# Patient Record
Sex: MALE | Race: WHITE | URBAN - METROPOLITAN AREA
[De-identification: names, ages, dates, MRNs, and addresses within clinical notes are randomized per-mention and may not be internally consistent; named-entity substitution may affect disease eponyms.]

---

## 2020-03-11 ENCOUNTER — OFFICE VISIT (OUTPATIENT)
Dept: URBAN - METROPOLITAN AREA CLINIC 71 | Facility: CLINIC | Age: 55
End: 2020-03-11
Payer: COMMERCIAL

## 2020-03-11 DIAGNOSIS — H52.4 PRESBYOPIA: Primary | ICD-10-CM

## 2020-03-11 DIAGNOSIS — H25.13 AGE-RELATED NUCLEAR CATARACT, BILATERAL: ICD-10-CM

## 2020-03-11 PROCEDURE — 92014 COMPRE OPH EXAM EST PT 1/>: CPT | Performed by: OPTOMETRIST

## 2020-03-11 RX ORDER — WARFARIN SODIUM 1 MG/1
1 MG TABLET ORAL
Qty: 0 | Refills: 0 | Status: ACTIVE
Start: 2020-03-11

## 2020-03-11 ASSESSMENT — INTRAOCULAR PRESSURE
OS: 21
OD: 18

## 2020-03-11 ASSESSMENT — KERATOMETRY
OD: 43.25
OS: 43.38

## 2020-03-11 ASSESSMENT — VISUAL ACUITY
OD: 20/20
OS: 20/20

## 2020-03-11 NOTE — IMPRESSION/PLAN
Impression: Presbyopia: H52.4. Bilateral. Plan: Presbyopia is the inability to focus on objects (ie: accommodate) due to the loss of flexibility of your natural lens. Presbyopia occurs with age. Reading glasses, bifocals, trifocals or contacts can be helpful. Contact the office if difficulty focusing persists despite corrective eye wear. New glasses rx given today.

## 2020-04-29 ENCOUNTER — OFFICE VISIT (OUTPATIENT)
Dept: URBAN - METROPOLITAN AREA CLINIC 71 | Facility: CLINIC | Age: 55
End: 2020-04-29
Payer: COMMERCIAL

## 2020-04-29 PROCEDURE — 92014 COMPRE OPH EXAM EST PT 1/>: CPT | Performed by: OPTOMETRIST

## 2020-04-29 ASSESSMENT — INTRAOCULAR PRESSURE
OD: 18
OS: 18

## 2020-04-29 NOTE — IMPRESSION/PLAN
Impression: Age-related nuclear cataract, bilateral: H25.13. Bilateral.
Stable Plan: Cataracts account for the patient's complaints. No treatment currently recommended. The patient will monitor vision changes and contact us with any decrease in vision.

## 2021-03-16 ENCOUNTER — OFFICE VISIT (OUTPATIENT)
Dept: URBAN - METROPOLITAN AREA CLINIC 71 | Facility: CLINIC | Age: 56
End: 2021-03-16
Payer: COMMERCIAL

## 2021-03-16 PROCEDURE — 92014 COMPRE OPH EXAM EST PT 1/>: CPT | Performed by: OPTOMETRIST

## 2021-03-16 ASSESSMENT — VISUAL ACUITY
OD: 20/20
OS: 20/20

## 2021-03-16 ASSESSMENT — INTRAOCULAR PRESSURE
OD: 21
OS: 19

## 2021-03-16 NOTE — IMPRESSION/PLAN
Impression: Presbyopia: H52.4. Plan: Presbyopia is the inability to focus on objects (ie: accommodate) due to the loss of flexibility of your natural lens. Presbyopia occurs with age. Reading glasses, bifocals, trifocals or contacts can be helpful. Contact the office if difficulty focusing persists despite corrective eye wear. Astigmatism causes blurred vision due to either an irregularly shaped cornea or the curvature of the lens. Small amounts of astigmatism do not need to be treated, but larger amounts can cause visual distortion, blurred vision, eye strain and headaches. New glasses RX given today. Continue to follow annually for vision exams.

## 2022-04-19 ENCOUNTER — OFFICE VISIT (OUTPATIENT)
Dept: URBAN - METROPOLITAN AREA CLINIC 71 | Facility: CLINIC | Age: 57
End: 2022-04-19
Payer: COMMERCIAL

## 2022-04-19 DIAGNOSIS — H52.4 PRESBYOPIA: Primary | ICD-10-CM

## 2022-04-19 DIAGNOSIS — H25.13 AGE-RELATED NUCLEAR CATARACT, BILATERAL: ICD-10-CM

## 2022-04-19 PROCEDURE — 92014 COMPRE OPH EXAM EST PT 1/>: CPT | Performed by: OPTOMETRIST

## 2022-04-19 ASSESSMENT — VISUAL ACUITY
OD: 20/20
OS: 20/20

## 2022-04-19 ASSESSMENT — INTRAOCULAR PRESSURE
OS: 15
OD: 14

## 2022-04-19 NOTE — IMPRESSION/PLAN
Impression: Presbyopia: H52.4. Plan: Presbyopia is the inability to focus on objects (ie: accommodate) due to the loss of flexibility of your natural lens. Presbyopia occurs with age. Reading glasses, bifocals, trifocals or contacts can be helpful. Contact the office if difficulty focusing persists despite corrective eye wear. New optional glasses RX given today for flattop bifocals. Discussed slight change in prescription. Continue to monitor annually for vision exams.

## 2022-04-19 NOTE — IMPRESSION/PLAN
Impression: Age-related nuclear cataract, bilateral: H25.13 Bilateral. Plan: Cataracts are stable and not interfering with his vision. No treatment currently recommended. The patient will monitor vision changes and contact us with any decrease in vision.

## 2022-06-28 ENCOUNTER — OFFICE VISIT (OUTPATIENT)
Dept: URBAN - METROPOLITAN AREA CLINIC 71 | Facility: CLINIC | Age: 57
End: 2022-06-28
Payer: COMMERCIAL

## 2022-06-28 DIAGNOSIS — H25.13 AGE-RELATED NUCLEAR CATARACT, BILATERAL: Primary | ICD-10-CM

## 2022-06-28 PROCEDURE — 99214 OFFICE O/P EST MOD 30 MIN: CPT | Performed by: OPTOMETRIST

## 2022-06-28 ASSESSMENT — KERATOMETRY
OS: 43.25
OD: 43.25

## 2022-06-28 ASSESSMENT — INTRAOCULAR PRESSURE
OD: 17
OS: 17

## 2023-06-14 ENCOUNTER — OFFICE VISIT (OUTPATIENT)
Dept: URBAN - METROPOLITAN AREA CLINIC 71 | Facility: CLINIC | Age: 58
End: 2023-06-14
Payer: COMMERCIAL

## 2023-06-14 DIAGNOSIS — H43.813 VITREOUS DEGENERATION, BILATERAL: ICD-10-CM

## 2023-06-14 DIAGNOSIS — H25.13 AGE-RELATED NUCLEAR CATARACT, BILATERAL: Primary | ICD-10-CM

## 2023-06-14 PROCEDURE — 99213 OFFICE O/P EST LOW 20 MIN: CPT | Performed by: OPTOMETRIST

## 2023-06-14 ASSESSMENT — INTRAOCULAR PRESSURE
OS: 15
OD: 18

## 2023-06-14 NOTE — IMPRESSION/PLAN
Impression: Age-related nuclear cataract, bilateral: H25.13 Bilateral.
NS 1+ OU stable DVA 20/20 OU Plan: Cataracts account for the patient's complaints. No treatment currently recommended. The patient will monitor vision changes and contact us with any decrease in vision.

## 2023-06-14 NOTE — IMPRESSION/PLAN
Impression: Vitreous degeneration, bilateral: H43.813. PVD OU Stable seen in [facility] and physical exam 
Floaters present Plan: Posterior vitreous detachments (PVD) usually diminish with time, but it may take several months. They rarely disappear entirely. PVD is a normal aging change due to vitreoius jelly pulling away from the retinal lining of the eye. They may accompany retinal tears, holes or detachments, so a dilated exam is important. Contact the office if symptoms worsen, including increased floaters, flashing light, loss of vision or a black curtain blocking your field of vision occurs.

## 2024-06-04 ENCOUNTER — OFFICE VISIT (OUTPATIENT)
Dept: URBAN - METROPOLITAN AREA CLINIC 71 | Facility: CLINIC | Age: 59
End: 2024-06-04
Payer: COMMERCIAL

## 2024-06-04 DIAGNOSIS — H25.13 AGE-RELATED NUCLEAR CATARACT, BILATERAL: ICD-10-CM

## 2024-06-04 DIAGNOSIS — H52.4 PRESBYOPIA: ICD-10-CM

## 2024-06-04 DIAGNOSIS — H43.813 VITREOUS DEGENERATION, BILATERAL: Primary | ICD-10-CM

## 2024-06-04 PROCEDURE — 99213 OFFICE O/P EST LOW 20 MIN: CPT | Performed by: OPTOMETRIST

## 2024-06-04 PROCEDURE — 92134 CPTRZ OPH DX IMG PST SGM RTA: CPT | Performed by: OPTOMETRIST

## 2024-06-04 PROCEDURE — 92133 CPTRZD OPH DX IMG PST SGM ON: CPT | Performed by: OPTOMETRIST

## 2024-06-04 ASSESSMENT — INTRAOCULAR PRESSURE
OS: 14
OD: 16

## 2024-06-04 ASSESSMENT — VISUAL ACUITY
OS: 20/20
OD: 20/20

## 2024-06-04 ASSESSMENT — KERATOMETRY
OD: 43.25
OS: 43.38

## 2025-05-02 ENCOUNTER — OFFICE VISIT (OUTPATIENT)
Dept: URBAN - METROPOLITAN AREA CLINIC 71 | Facility: CLINIC | Age: 60
End: 2025-05-02
Payer: COMMERCIAL

## 2025-05-02 DIAGNOSIS — H43.813 VITREOUS DEGENERATION, BILATERAL: Primary | ICD-10-CM

## 2025-05-02 DIAGNOSIS — H52.4 PRESBYOPIA: ICD-10-CM

## 2025-05-02 DIAGNOSIS — H25.13 AGE-RELATED NUCLEAR CATARACT, BILATERAL: ICD-10-CM

## 2025-05-02 PROCEDURE — 92015 DETERMINE REFRACTIVE STATE: CPT

## 2025-05-02 PROCEDURE — 99213 OFFICE O/P EST LOW 20 MIN: CPT

## 2025-05-02 ASSESSMENT — VISUAL ACUITY
OD: 20/20
OS: 20/20

## 2025-05-02 ASSESSMENT — INTRAOCULAR PRESSURE
OD: 10
OS: 12